# Patient Record
Sex: FEMALE | Race: WHITE | Employment: PART TIME | URBAN - METROPOLITAN AREA
[De-identification: names, ages, dates, MRNs, and addresses within clinical notes are randomized per-mention and may not be internally consistent; named-entity substitution may affect disease eponyms.]

---

## 2018-09-17 ENCOUNTER — OFFICE VISIT (OUTPATIENT)
Dept: SURGERY | Age: 63
End: 2018-09-17

## 2018-09-17 VITALS
DIASTOLIC BLOOD PRESSURE: 74 MMHG | BODY MASS INDEX: 26.99 KG/M2 | HEIGHT: 65 IN | TEMPERATURE: 98.6 F | SYSTOLIC BLOOD PRESSURE: 140 MMHG | HEART RATE: 64 BPM | WEIGHT: 162 LBS | RESPIRATION RATE: 18 BRPM

## 2018-09-17 DIAGNOSIS — Z12.11 ENCOUNTER FOR SCREENING COLONOSCOPY: ICD-10-CM

## 2018-09-17 DIAGNOSIS — K62.5 RECTAL BLEEDING: Primary | ICD-10-CM

## 2018-09-17 DIAGNOSIS — K64.2 GRADE III HEMORRHOIDS: ICD-10-CM

## 2018-09-17 NOTE — MR AVS SNAPSHOT
[address] [facility] [address] [facility] 
[phone] Patient: Victoria Simpson MRN: [id number] FFB:[id number] Visit Information Date & Time Provider Department Dept. Phone Encounter #  
 9/17/2018 10:00 AM Jacklin Halsted, MD [facility] [phone] [id number] Follow-up Instructions Return for Colonoscopy. Upcoming Health Maintenance Date Due Hepatitis C Screening 1955 DTaP/Tdap/Td series (1 - Tdap) 7/15/1975 PAP AKA CERVICAL CYTOLOGY 7/15/1975 FOBT Q 1 YEAR AGE 50-75 7/15/2004 ZOSTER VACCINE AGE 60> 5/15/2014 BREAST CANCER SCRN MAMMOGRAM 8/25/2017 Influenza Age 5 to Adult 8/1/2018 Allergies as of 9/17/2018  Review Complete On: 9/17/2018 By: Jacklin Halsted, MD  
 No Known Allergies Current Immunizations  Never Reviewed No immunizations on file. Not reviewed this visit You Were Diagnosed With   
  
 Codes Comments Rectal bleeding    -  Primary ICD-10-CM: K62.5 ICD-9-CM: 569.3 Encounter for screening colonoscopy     ICD-10-CM: Z12.11 ICD-9-CM: V76.51 Grade III hemorrhoids     ICD-10-CM: G95.8 ICD-9-CM: 455.0 Vitals BP Pulse Temp Resp Height(growth percentile) Weight(growth percentile) 140/74 64 98.6 °F (37 °C) 18 5' 5\" (1.651 m) 162 lb (73.5 kg) BMI OB Status Smoking Status 26.96 kg/m2 Menopause Former Smoker BMI and BSA Data Body Mass Index Body Surface Area  
 26.96 kg/m 2 1.84 m 2 Preferred Pharmacy Pharmacy Name Phone [facility] [place] [address] On license of [facility] [phone] Your Updated Medication List  
  
   
This list is accurate as of 9/17/18 10:21 AM.  Always use your most recent med list.  
  
  
  
  
 sertraline 50 mg tablet Commonly known as:  ZOLOFT  
  
 ZYRTEC PO Take  by mouth. We Performed the Following RI ANOSCOPY DX W/COLLJ SPEC BR/WA SPX WHEN PRFRMD [id number] CPT(R)] Follow-up Instructions Return for Colonoscopy. To-Do List   
 As directed GI:  COLONOSCOPY Patient Instructions High fiber diet and fiber supplement like metamucil or citrucel powder one adult dose daily in large glass water schedule colonoscopy Introducing \[facility] [place] [facility]\"" & HEALTH SERVICES! Tomasz Jeannie introduces [employer] patient portal. Now you can access parts of your medical record, email your doctor's office, and request medication refills online. 1. In your internet browser, go to [email or web address]. Send Word Now/[employer] 2. Click on the First Time User? Click Here link in the Sign In box. You will see the New Member Sign Up page. 3. Enter your [employer] Access Code exactly as it appears below. You will not need to use this code after youve completed the sign-up process. If you do not sign up before the expiration date, you must request a new code. · [employer] Access Code: [email or web address] Expires: 12/16/2018  9:36 AM 
 
4. Enter the last four digits of your Social Security Number (xxxx) and Date of Birth (mm/dd/yyyy) as indicated and click Submit. You will be taken to the next sign-up page. 5. Create a [employer] ID. This will be your [employer] login ID and cannot be changed, so think of one that is secure and easy to remember. 6. Create a [employer] password. You can change your password at any time. 7. Enter your Password Reset Question and Answer. This can be used at a later time if you forget your password. 8. Enter your e-mail address. You will receive e-mail notification when new information is available in [address] 9. Click Sign Up. You can now view and download portions of your medical record. 10. Click the Download Summary menu link to download a portable copy of your medical information.  
 
If you have questions, please visit the Frequently Asked Questions section of the [employer]. Remember, [employer] is NOT to be used for urgent needs. For medical emergencies, dial 911. Now available from your iPhone and Android! Please provide this summary of care documentation to your next provider. Your primary care clinician is listed as Sonia Barrientos. If you have any questions after today's visit, please call [phone].

## 2018-09-17 NOTE — LETTER
9/17/2018 10:21 AM 
 
Patient:  Drea Solis YOB: 1955 Date of Visit: 9/17/2018 Júnior Sarmiento MD 
[address] [facility] [place] [facility] [place] [address] [facility] [address] VIA In Basket Dear Madhavi Taylor, I saw Nancie Vasquez in the office today regarding her hemorrhoids. Her symptoms are very mild, she has occasional spotting on the toilet paper and mild discomfort with bowel movement. On anoscopy she does have some enlarged internal hemorrhoids in the right anterior and posterior quadrants. I will start her on a fiber supplement to see if this helps her symptoms and we will schedule her for colonoscopy for screening purposes. She can return to the office should hemorrhoidal symptoms persist.  I am somewhat hesitant to recommend surgery, as the patient states she has had a large number of vaginal deliveries and displays a slightly patulous anus on exam.  The hemorrhoids do contribute a small amount to overall continence. Thank you very much for your referral of Ms. Drea Solis. If you have questions, please do not hesitate to call me. I look forward to following Ms. Collin Kirt along with you and will keep you updated as to her progress. Sincerely, Syed Pedroza MD

## 2018-09-17 NOTE — PROGRESS NOTES
HPI: Fawn Cerrato is a 58 y.o. female presenting with chief complain of hemorrhoids and need for colorectal cancer screening. Patient has had mild hemorrhoidal symptoms for years. She has discomfort with defecation and occasional spotting blood on the toilet paper. She does not need to manually reduce the hemorrhoids. She denies abdominal complaints or unexplained weight loss. She has 1 bowel movement per day, sporadic episodes of diarrhea. She is not on a bowel regimen. She has had a few liquid stool incontinent episodes in her lifetime. She has never had a colonoscopy. She has no family history of colorectal cancer. Past medical history: Negative    Past Surgical History:   Procedure Laterality Date    HX DILATION AND CURETTAGE      HX HEENT      broken nose       Family medical history: No history of colon rectal cancer    Social History     Social History    Marital status:      Spouse name: N/A    Number of children: N/A    Years of education: N/A     Social History Main Topics    Smoking status: Former Smoker     Quit date: 9/17/2011    Smokeless tobacco: Never Used    Alcohol use 0.0 oz/week     0 Standard drinks or equivalent per week      Comment: occasional    Drug use: None    Sexual activity: Not Asked     Other Topics Concern    None     Social History Narrative       Review of Systems - Review of Systems   Constitutional: Negative. HENT: Negative. Eyes: Negative. Respiratory: Negative. Cardiovascular: Negative. Gastrointestinal: Positive for blood in stool and diarrhea. Negative for abdominal pain, constipation, heartburn, melena, nausea and vomiting. Genitourinary: Negative. Musculoskeletal: Negative. Skin: Negative. Neurological: Negative. Endo/Heme/Allergies: Negative. Psychiatric/Behavioral: Negative.         Outpatient Prescriptions Marked as Taking for the 9/17/18 encounter (Office Visit) with aCrlyle Mas MD   Medication Sig Dispense Refill    cetirizine HCl (ZYRTEC PO) Take  by mouth. No Known Allergies    Vitals:    09/17/18 0959   BP: 140/74   Pulse: 64   Resp: 18   Temp: 98.6 °F (37 °C)   Weight: 73.5 kg (162 lb)   Height: 5' 5\" (1.651 m)   PainSc:   0 - No pain       Physical Exam   Constitutional: She appears well-developed and well-nourished. HENT:   Head: Normocephalic and atraumatic. Eyes: Conjunctivae and EOM are normal.   Abdominal: Soft. She exhibits no distension. There is no tenderness. Musculoskeletal: Normal range of motion. Lymphadenopathy:     She has no cervical adenopathy. Right: No inguinal adenopathy present. Left: No inguinal adenopathy present. Neurological: She exhibits normal muscle tone. Skin: Skin is warm and dry. No rash noted. Psychiatric: She has a normal mood and affect. Her speech is normal.   Rectum: Prolapsed right anterior quadrant internal hemorrhoid, somewhat patulous anus  Digital rectal exam: Moderate tone, no mass  Anoscopy: Enlarged internal hemorrhoids in right anterior and posterior quadrants    Assessment / Plan    Grade 3 hemorrhoids  Symptoms are very mild, recommend high-fiber diet and fiber supplementation  Patient has has had numerous deliveries and displays a slightly patulous anus, exhaust conservative measures prior to considering hemorrhoid surgery    Need for colorectal cancer screening  Schedule colonoscopy      The diagnoses and plan were discussed with the patient. All questions answered. Plan of care agreed to by all concerned.

## 2018-09-17 NOTE — PATIENT INSTRUCTIONS
High fiber diet and fiber supplement like metamucil or citrucel powder one adult dose daily in large glass water schedule colonoscopy

## 2020-01-08 ENCOUNTER — TELEPHONE (OUTPATIENT)
Dept: SURGERY | Age: 65
End: 2020-01-08

## 2023-12-29 ENCOUNTER — HOSPITAL ENCOUNTER (OUTPATIENT)
Facility: HOSPITAL | Age: 68
Discharge: HOME | End: 2023-12-29
Attending: STUDENT IN AN ORGANIZED HEALTH CARE EDUCATION/TRAINING PROGRAM
Payer: COMMERCIAL

## 2023-12-29 DIAGNOSIS — Z13.820 OSTEOPOROSIS SCREENING: ICD-10-CM

## 2023-12-29 DIAGNOSIS — R93.7 ABNORMAL BONE DENSITY SCREENING: ICD-10-CM

## 2023-12-29 PROCEDURE — 77080 DXA BONE DENSITY AXIAL: CPT

## 2024-01-12 ENCOUNTER — HOSPITAL ENCOUNTER (OUTPATIENT)
Facility: HOSPITAL | Age: 69
Setting detail: RECURRING SERIES
Discharge: HOME | End: 2024-01-15
Payer: COMMERCIAL

## 2024-01-12 PROCEDURE — 97161 PT EVAL LOW COMPLEX 20 MIN: CPT

## 2024-01-12 PROCEDURE — 97110 THERAPEUTIC EXERCISES: CPT

## 2024-01-12 NOTE — THERAPY EVALUATION
[facility]  [address] [place] [address] Ph:[phone] Fx: [phone]  Plan of Care / Statement of Necessity for Physical Therapy Services     Patient Name: Julia Tee : 1955   Medical   Diagnosis: Right shoulder pain [M25.511]  Left shoulder pain [M25.512]  Upper back pain [M54.9]  Right thigh pain [M79.651]  Left thigh pain [M79.652] Treatment Diagnosis: M79.604  Pain in right leg and M79.605  Pain in left leg , M25.511  RIGHT SHOULDER PAIN and M25.512  LEFT SHOULDER PAIN, and M54.2  NECK PAIN      Onset Date: 1 yr Payor :  Payor: MARTY / Plan: HUY FERGUSON FEDERAL / Product Type: *No Product type* /    Referral Source: Chanell Maria MD Start of Care (SOC): 2024   Prior Hospitalization: See medical history Provider #: [id number]   Prior Level of Function: , Likes to take walks 1-2 miles, ride her Bike.    Comorbidities: HBP, LS Pain     Assessment / key information:  Pt ia a 68 yr old female with onset of CS upper Bach, And bilateral shoulder pain as well as Hip and Thigh pain that insidiously began approx 1 yr ago. Pt reports mostly tightness that is inhibiting Functional Activities and ADL's. Pt is RHD and pain travels into her right UE intermittently.   Pt has mobility restrictions at the CT junction and tightness to UT /LS bilaterally. She is restricted  in CS rotation/Extension as well as  Side bend. Bilateral Shoulder ROM is WFL in all directions including Functional IR.   Pt  reports bilateral hip tightness and sometimes pain to her right Hip flexor. She is restricted in hip IR/ER on the right and has weak right hip Glutes and abductors.      Pt will benefit from skilled PT to improve ROM, decrease pain and improve functional activity tolerance to be able to tolerate ADL's.     Evaluation Complexity:  History:  LOW Complexity : Zero comorbidities / personal factors that will impact the outcome /

## 2024-01-12 NOTE — PROGRESS NOTES
PHYSICAL / OCCUPATIONAL THERAPY - DAILY TREATMENT NOTE    Patient Name: Julia Tee    Date: 2024    : 1955  Insurance: Payor: MARTY / Plan: HUY FERGUSON FEDERAL / Product Type: *No Product type* /      Patient  verified Yes     Visit #   Current / Total 1 8-12   Time   In / Out 1001 1039   Pain   In / Out 3 3   Subjective Functional Status/Changes: Pt ia a 68 yr old female with onset of CS upper Bach, And bilateral shoulder pain as well as Hip and Thigh pain that insidiously began approx 1 yr ago. Pt reports mostly tightness that is inhibiting Functional Activities and ADL's. Pt is RHD and pain travels into her right UE intermittently.      TREATMENT AREA =  Right shoulder pain [M25.511]  Left shoulder pain [M25.512]  Upper back pain [M54.9]  Right thigh pain [M79.651]  Left thigh pain [M79.652]    OBJECTIVE         Therapeutic Procedures:    Tx Min Billable or 1:1 Min (if diff from Tx Min) Procedure, Rationale, Specifics   25  32977 Therapeutic Exercise (timed):  increase ROM, strength, coordination, balance, and proprioception to improve patient's ability to progress to PLOF and address remaining functional goals. (see flow sheet as applicable)     Details if applicable:       25  [facility] Totals Reminder: bill using total billable min of TIMED therapeutic procedures (example: do not include dry needle or estim unattended, both untimed codes, in totals to left)  8-22 min = 1 unit; 23-37 min = 2 units; 38-52 min = 3 units; 53-67 min = 4 units; 68-82 min = 5 units   Total Total     [x]  Patient Education billed concurrently with other procedures   [x] Review HEP    [] Progressed/Changed HEP, detail:    [] Other detail:       Objective Information/Functional Measures/Assessment    CS : Rotation left=38 deg/ Right=42 deg.   CS Sidebend: left=10 deg/ Right=12 deg  CS Extension= 42 deg.     Bilateral Shoulder ROM : WFL/All directions.   Posture : fair/good.     Right Hip Abd=: 4/5/ Left Hip

## 2024-01-15 ENCOUNTER — HOSPITAL ENCOUNTER (OUTPATIENT)
Facility: HOSPITAL | Age: 69
Setting detail: RECURRING SERIES
Discharge: HOME | End: 2024-01-18
Payer: COMMERCIAL

## 2024-01-15 PROCEDURE — 97530 THERAPEUTIC ACTIVITIES: CPT

## 2024-01-15 PROCEDURE — 97110 THERAPEUTIC EXERCISES: CPT

## 2024-01-15 NOTE — PROGRESS NOTES
PHYSICAL / OCCUPATIONAL THERAPY - DAILY TREATMENT NOTE    Patient Name: Julia Tee    Date: 1/15/2024    : 1955  Insurance: Payor: MARTY / Plan: HUY FERGUSON FEDERAL / Product Type: *No Product type* /      Patient  verified Yes     Visit #   Current / Total 2 8-12   Time   In / Out 10:42 11:25   Pain   In / Out 5/10 3/10 \"a little better\"   Subjective Functional Status/Changes: Pt reports her pain has been a little more since she started doing her exercises.    Patient stated she hates giving a number, for pain, she hates that question.      TREATMENT AREA =  Right shoulder pain [M25.511]  Left shoulder pain [M25.512]  Upper back pain [M54.9]  Right thigh pain [M79.651]  Left thigh pain [M79.652]    OBJECTIVE    Modalities Rationale:     decrease pain and increase tissue extensibility to improve patient's ability to progress to PLOF and address remaining functional goals.     min [] Estim Unattended, type/location:                                      []  w/ice    []  w/heat    min [] Estim Attended, type/location:                                     []  w/US     []  w/ice    []  w/heat    []  TENS insruct      min []  Mechanical Traction: type/lbs                   []  pro   []  sup   []  int   []  cont    []  before manual    []  after manual    min []  Ultrasound, settings/location:     10 min  unbill []  Ice     [x]  Heat    location/position: Seated - c/s and bilateral UTs    min []  Paraffin,  details:     min []  Vasopneumatic Device, press/temp:     min []  Whirlpool / Fluido:    If using vaso (only need to measure limb vaso being performed on)      pre-treatment girth :       post-treatment girth :       measured at (landmark location) :      min []  Other:    Skin assessment post-treatment:   Intact      Therapeutic Procedures:    Tx Min Billable or 1:1 Min (if diff from Tx Min) Procedure, Rationale, Specifics   25  69274 Therapeutic Exercise (timed):  increase ROM, strength, coordination,

## 2024-01-18 ENCOUNTER — HOSPITAL ENCOUNTER (OUTPATIENT)
Facility: HOSPITAL | Age: 69
Setting detail: RECURRING SERIES
Discharge: HOME | End: 2024-01-21
Payer: COMMERCIAL

## 2024-01-18 PROCEDURE — 97530 THERAPEUTIC ACTIVITIES: CPT

## 2024-01-18 PROCEDURE — 97110 THERAPEUTIC EXERCISES: CPT

## 2024-01-18 NOTE — PROGRESS NOTES
PHYSICAL / OCCUPATIONAL THERAPY - DAILY TREATMENT NOTE    Patient Name: Julia Tee    Date: 2024    : 1955  Insurance: Payor: MARTY / Plan: HUY FERGUSON FEDERAL / Product Type: *No Product type* /      Patient  verified Yes     Visit #   Current / Total 3 8-12   Time   In / Out 2:40 3:34   Pain   In / Out 5/10 both hips 1/10   Subjective Functional Status/Changes: Pt reports she is noticing she is a little more sore and has been doing her exercises once a day.      TREATMENT AREA =  Right shoulder pain [M25.511]  Left shoulder pain [M25.512]  Upper back pain [M54.9]  Right thigh pain [M79.651]  Left thigh pain [M79.652]    OBJECTIVE    Modalities Rationale:     decrease pain and increase tissue extensibility to improve patient's ability to progress to PLOF and address remaining functional goals.     min [] Estim Unattended, type/location:                                      []  w/ice    []  w/heat    min [] Estim Attended, type/location:                                     []  w/US     []  w/ice    []  w/heat    []  TENS insruct      min []  Mechanical Traction: type/lbs                   []  pro   []  sup   []  int   []  cont    []  before manual    []  after manual    min []  Ultrasound, settings/location:     10 min  unbill []  Ice     [x]  Heat    location/position: Seated - bilateral hips    min []  Paraffin,  details:     min []  Vasopneumatic Device, press/temp:     min []  Whirlpool / Fluido:    If using vaso (only need to measure limb vaso being performed on)      pre-treatment girth :       post-treatment girth :       measured at (landmark location) :      min []  Other:    Skin assessment post-treatment:   Intact      Therapeutic Procedures:    Tx Min Billable or 1:1 Min (if diff from Tx Min) Procedure, Rationale, Specifics   32  05094 Therapeutic Exercise (timed):  increase ROM, strength, coordination, balance, and proprioception to improve patient's ability to progress to PLOF and

## 2024-01-23 ENCOUNTER — HOSPITAL ENCOUNTER (OUTPATIENT)
Facility: HOSPITAL | Age: 69
Setting detail: RECURRING SERIES
Discharge: HOME | End: 2024-01-26
Payer: COMMERCIAL

## 2024-01-23 PROCEDURE — 97530 THERAPEUTIC ACTIVITIES: CPT

## 2024-01-23 PROCEDURE — 97110 THERAPEUTIC EXERCISES: CPT

## 2024-01-23 NOTE — PROGRESS NOTES
location) :      min []  Other:    Skin assessment post-treatment:   Intact      Therapeutic Procedures:    Tx Min Billable or 1:1 Min (if diff from Tx Min) Procedure, Rationale, Specifics   30  67376 Therapeutic Exercise (timed):  increase ROM, strength, coordination, balance, and proprioception to improve patient's ability to progress to PLOF and address remaining functional goals. (see flow sheet as applicable)     Details if applicable:       18  76704 Therapeutic Activity (timed):  use of dynamic activities replicating functional movements to increase ROM, strength, coordination, balance, and proprioception in order to improve patient's ability to progress to PLOF and address remaining functional goals.  (see flow sheet as applicable)     Details if applicable:  car transfer, review of DOMS and therapy progressions; ice vs heat application with ability to trial ice/ice massage to lateral hips/greater trochanter   48  [facility] Totals Reminder: bill using total billable min of TIMED therapeutic procedures (example: do not include dry needle or estim unattended, both untimed codes, in totals to left)  8-22 min = 1 unit; 23-37 min = 2 units; 38-52 min = 3 units; 53-67 min = 4 units; 68-82 min = 5 units   Total Total     [x]  Patient Education billed concurrently with other procedures   [x] Review HEP    [] Progressed/Changed HEP, detail:    [] Other detail:       Objective Information/Functional Measures/Assessment      Reviewed car transfer with sitting in seat first then bringing legs in 1 at a time and keeping feet closer together  Reviewed progressions with therapy including ability to transition to flexibility/stretching at home and more strength training during session  Min increased anterior right hip pain with mini lunges eliminated with breif rest      Progressed activities per flow sheet. Patient with good overall tolerance to session. Patient presently reports greatest difficulty with floor

## 2024-01-26 ENCOUNTER — HOSPITAL ENCOUNTER (OUTPATIENT)
Facility: HOSPITAL | Age: 69
Setting detail: RECURRING SERIES
Discharge: HOME | End: 2024-01-29
Payer: COMMERCIAL

## 2024-01-26 PROCEDURE — 97110 THERAPEUTIC EXERCISES: CPT

## 2024-01-26 PROCEDURE — 97530 THERAPEUTIC ACTIVITIES: CPT

## 2024-01-26 NOTE — PROGRESS NOTES
PHYSICAL / OCCUPATIONAL THERAPY - DAILY TREATMENT NOTE    Patient Name: Julia Tee    Date: 2024    : 1955  Insurance: Payor: MARTY / Plan: HUY FERGUSON FEDERAL / Product Type: *No Product type* /      Patient  verified yes   Visit #   Current / Total 5 12   Time   In / Out 10:00 10:38   Pain   In / Out 5/10 left shoulder 0/10   Subjective Functional Status/Changes: Pt reports she feels pretty good today just her shoulder hurts. Yesterday she hurts a lot but she noticed a pattern that she hurts more when she works, she is a  and is on her feet a lot.      TREATMENT AREA =  Right shoulder pain [M25.511]  Left shoulder pain [M25.512]  Upper back pain [M54.9]  Right thigh pain [M79.651]  Left thigh pain [M79.652]    OBJECTIVE    Therapeutic Procedures:    Tx Min Billable or 1:1 Min (if diff from Tx Min) Procedure, Rationale, Specifics   15  28613 Therapeutic Exercise (timed):  increase ROM, strength, coordination, balance, and proprioception to improve patient's ability to progress to PLOF and address remaining functional goals. (see flow sheet as applicable)     Details if applicable:  see flow sheet     23  58591 Therapeutic Activity (timed):  use of dynamic activities replicating functional movements to increase ROM, strength, coordination, balance, and proprioception in order to improve patient's ability to progress to PLOF and address remaining functional goals.  (see flow sheet as applicable)     Details if applicable:  mini squats, mini lunges, KZ row/ext for posture; eccentric sit to stand     38  [facility] Totals Reminder: bill using total billable min of TIMED therapeutic procedures (example: do not include dry needle or estim unattended, both untimed codes, in totals to left)  8-22 min = 1 unit; 23-37 min = 2 units; 38-52 min = 3 units; 53-67 min = 4 units; 68-82 min = 5 units   Total Total     [x]  Patient Education billed concurrently with other procedures   [x] Review HEP

## 2024-01-29 ENCOUNTER — HOSPITAL ENCOUNTER (OUTPATIENT)
Facility: HOSPITAL | Age: 69
Setting detail: RECURRING SERIES
Discharge: HOME | End: 2024-02-01
Payer: COMMERCIAL

## 2024-01-29 PROCEDURE — 97530 THERAPEUTIC ACTIVITIES: CPT

## 2024-01-29 PROCEDURE — 97110 THERAPEUTIC EXERCISES: CPT

## 2024-01-29 NOTE — PROGRESS NOTES
PHYSICAL / OCCUPATIONAL THERAPY - DAILY TREATMENT NOTE    Patient Name: Julia Tee    Date: 2024    : 1955  Insurance: Payor: MARTY / Plan: HUY FERGUSON FEDERAL / Product Type: *No Product type* /      Patient  verified yes   Visit #   Current / Total 6 12   Time   In / Out 3:20 3:58   Pain   In / Out 5/10 groin, R>L 2/10   Subjective Functional Status/Changes: Pt reports she has had groin pain, it is one thing she is coming for but it is what she is noticing most today.      TREATMENT AREA =  Right shoulder pain [M25.511]  Left shoulder pain [M25.512]  Upper back pain [M54.9]  Right thigh pain [M79.651]  Left thigh pain [M79.652]    OBJECTIVE    Therapeutic Procedures:    Tx Min Billable or 1:1 Min (if diff from Tx Min) Procedure, Rationale, Specifics   15  92492 Therapeutic Exercise (timed):  increase ROM, strength, coordination, balance, and proprioception to improve patient's ability to progress to PLOF and address remaining functional goals. (see flow sheet as applicable)     Details if applicable:  see flow sheet     23  54455 Therapeutic Activity (timed):  use of dynamic activities replicating functional movements to increase ROM, strength, coordination, balance, and proprioception in order to improve patient's ability to progress to PLOF and address remaining functional goals.  (see flow sheet as applicable)     Details if applicable:  mini squats, mini lunges, KZ row/ext for posture; eccentric sit to stand, ROM measurements     38  [facility] Totals Reminder: bill using total billable min of TIMED therapeutic procedures (example: do not include dry needle or estim unattended, both untimed codes, in totals to left)  8-22 min = 1 unit; 23-37 min = 2 units; 38-52 min = 3 units; 53-67 min = 4 units; 68-82 min = 5 units   Total Total     [x]  Patient Education billed concurrently with other procedures   [x] Review HEP    [] Progressed/Changed HEP, detail:    [] Other detail:       Objective

## 2024-02-02 ENCOUNTER — HOSPITAL ENCOUNTER (OUTPATIENT)
Facility: HOSPITAL | Age: 69
Setting detail: RECURRING SERIES
Discharge: HOME | End: 2024-02-05
Payer: COMMERCIAL

## 2024-02-02 PROCEDURE — 97530 THERAPEUTIC ACTIVITIES: CPT

## 2024-02-02 PROCEDURE — 97110 THERAPEUTIC EXERCISES: CPT

## 2024-02-02 PROCEDURE — 97112 NEUROMUSCULAR REEDUCATION: CPT

## 2024-02-02 NOTE — PROGRESS NOTES
PHYSICAL / OCCUPATIONAL THERAPY - DAILY TREATMENT NOTE    Patient Name: Julia Tee    Date: 2024    : 1955  Insurance: Payor: MARTY / Plan: [employer]Y [employer] FEDERAL / Product Type: *No Product type* /      Patient  verified Yes     Visit #   Current / Total 7 12   Time   In / Out 1252 149   Pain   In / Out 3 2   Subjective Functional Status/Changes: She has not been doing any walking. She has been doing the ex's. Pain is mostly at the hips and the  front and sides.      TREATMENT AREA =  Right shoulder pain [M25.511]  Left shoulder pain [M25.512]  Upper back pain [M54.9]  Right thigh pain [M79.651]  Left thigh pain [M79.652]    OBJECTIVE    Modalities Rationale:     decrease pain to improve patient's ability to progress to PLOF and address remaining functional goals.     min [] Estim Unattended, type/location:                                      []  w/ice    []  w/heat    min [] Estim Attended, type/location:                                     []  w/US     []  w/ice    []  w/heat    []  TENS insruct      min []  Mechanical Traction: type/lbs                   []  pro   []  sup   []  int   []  cont    []  before manual    []  after manual    min []  Ultrasound, settings/location:     10 min  unbill []  Ice     [x]  Heat    location/position:     min []  Paraffin,  details:     min []  Vasopneumatic Device, press/temp:     min []  Whirlpool / Fluido:    If using vaso (only need to measure limb vaso being performed on)      pre-treatment girth :       post-treatment girth :       measured at (landmark location) :      min []  Other:    Skin assessment post-treatment:   Intact      Therapeutic Procedures:    Tx Min Billable or 1:1 Min (if diff from Tx Min) Procedure, Rationale, Specifics   [id number] Therapeutic Exercise (timed):  increase ROM, strength, coordination, balance, and proprioception to improve patient's ability to progress to PLOF and address remaining functional goals. (see flow sheet as

## 2024-02-05 ENCOUNTER — HOSPITAL ENCOUNTER (OUTPATIENT)
Facility: HOSPITAL | Age: 69
Setting detail: RECURRING SERIES
Discharge: HOME | End: 2024-02-08
Payer: COMMERCIAL

## 2024-02-05 PROCEDURE — 97530 THERAPEUTIC ACTIVITIES: CPT | Performed by: PHYSICAL THERAPIST

## 2024-02-05 PROCEDURE — 97110 THERAPEUTIC EXERCISES: CPT | Performed by: PHYSICAL THERAPIST

## 2024-02-05 NOTE — PROGRESS NOTES
PHYSICAL / OCCUPATIONAL THERAPY - DAILY TREATMENT NOTE    Patient Name: Julia Tee    Date: 2024    : 1955  Insurance: Payor: MARTY / Plan: HUY [employer] FEDERAL / Product Type: *No Product type* /      Patient  verified Yes     Visit #   Current / Total 8 12   Time   In / Out 1122am 1218 (56)   Pain   In / Out 7-8 7-8   Subjective Functional Status/Changes: Pt reports significant increased pain in R groin and lateral hip after last session.      TREATMENT AREA =  Right shoulder pain [M25.511]  Left shoulder pain [M25.512]  Upper back pain [M54.9]  Right thigh pain [M79.651]  Left thigh pain [M79.652]    OBJECTIVE    Modalities Rationale:     decrease pain to improve patient's ability to progress to PLOF and address remaining functional goals.     min [] Estim Unattended, type/location:                                      []  w/ice    []  w/heat    min [] Estim Attended, type/location:                                     []  w/US     []  w/ice    []  w/heat    []  TENS insruct      min []  Mechanical Traction: type/lbs                   []  pro   []  sup   []  int   []  cont    []  before manual    []  after manual    min []  Ultrasound, settings/location:     15 min  unbill [x]  Ice     [x]  Heat    location/position: Ice to lateral R hip , MHP to c/s     min []  Paraffin,  details:     min []  Vasopneumatic Device, press/temp:     min []  Whirlpool / Fluido:    If using vaso (only need to measure limb vaso being performed on)      pre-treatment girth :       post-treatment girth :       measured at (landmark location) :      min []  Other:    Skin assessment post-treatment:   Intact      Therapeutic Procedures:    Tx Min Billable or 1:1 Min (if diff from Tx Min) Procedure, Rationale, Specifics   26  [id number] Therapeutic Exercise (timed):  increase ROM, strength, coordination, balance, and proprioception to improve patient's ability to progress to PLOF and address remaining functional goals. (see

## 2024-02-09 ENCOUNTER — HOSPITAL ENCOUNTER (OUTPATIENT)
Facility: HOSPITAL | Age: 69
Setting detail: RECURRING SERIES
Discharge: HOME | End: 2024-02-12
Payer: COMMERCIAL

## 2024-02-09 PROCEDURE — 97110 THERAPEUTIC EXERCISES: CPT

## 2024-02-09 PROCEDURE — 97530 THERAPEUTIC ACTIVITIES: CPT

## 2024-02-09 PROCEDURE — 97140 MANUAL THERAPY 1/> REGIONS: CPT

## 2024-02-09 NOTE — PROGRESS NOTES
PHYSICAL / OCCUPATIONAL THERAPY - DAILY TREATMENT NOTE    Patient Name: Julia Tee    Date: 2024    : 1955  Insurance: Payor: MARTY / Plan: HUY [employer] FEDERAL / Product Type: *No Product type* /      Patient  verified Yes     Visit #   Current / Total 9 12   Time   In / Out 1240 141   Pain   In / Out 7 3   Subjective Functional Status/Changes: Reports no change in sx so far. Her right hip continues to hurt and she thinks it's getting worst.      TREATMENT AREA =  Right shoulder pain [M25.511]  Left shoulder pain [M25.512]  Upper back pain [M54.9]  Right thigh pain [M79.651]  Left thigh pain [M79.652]    OBJECTIVE    Modalities Rationale:     decrease pain to improve patient's ability to progress to PLOF and address remaining functional goals.     min [] Estim Unattended, type/location:                                      []  w/ice    []  w/heat    min [] Estim Attended, type/location:                                     []  w/US     []  w/ice    []  w/heat    []  TENS insruct      min []  Mechanical Traction: type/lbs                   []  pro   []  sup   []  int   []  cont    []  before manual    []  after manual    min []  Ultrasound, settings/location:     15 min  unbill [x]  Ice     [x]  Heat    location/position: Ice to lateral R hip , MHP to c/s     min []  Paraffin,  details:     min []  Vasopneumatic Device, press/temp:     min []  Whirlpool / Fluido:    If using vaso (only need to measure limb vaso being performed on)      pre-treatment girth :       post-treatment girth :       measured at (landmark location) :      min []  Other:    Skin assessment post-treatment:   Intact      Therapeutic Procedures:    Tx Min Billable or 1:1 Min (if diff from Tx Min) Procedure, Rationale, Specifics   18  45682 Therapeutic Exercise (timed):  increase ROM, strength, coordination, balance, and proprioception to improve patient's ability to progress to PLOF and address remaining functional goals. (see 
VENKATESH ZARCO [facility]  [address] [place] [address] - Ph: [phone]   Fx: [phone]  PHYSICAL THERAPY PROGRESS NOTE  [x] Progress Note  [] Discharge Summary    Patient Name: Julia Tee : 1955   Treatment/Medical Diagnosis: Right shoulder pain [M25.511]  Left shoulder pain [M25.512]  Upper back pain [M54.9]  Right thigh pain [M79.651]  Left thigh pain [M79.652]   Referral Source: Chanell Maria MD     Date of Initial Visit: 2024 Attended Visits: 9 Missed Visits: 0       Comorbidities: HBP, LS Pain   Prior Level of Function:, Likes to take walks 1-2 miles, ride her Bike     SUMMARY OF TREATMENT  ***    CURRENT STATUS/Progress towards Goals:    Short Term Goals: To be accomplished in 1 weeks  Goal:Pt will be issued a HEP to improve function.   Status at evaluation: Issued HEP at Eval.  Current:      Long Term Goals: To be accomplished in 6 weeks  Goal: Pt  will increase FOTO score by 7  pts to improve function.   Status at evaluation:   Current:     2.   Goal:Pt will report >60% improvement in sx to ease with ADL's  Status at evaluation:0% james Eval.   Current:     3.   Goal:Pt will increase CS rotation >7 deg bilaterally to ease with Driving function.   Status at evaluation: Right  CS rotation=42 deg/ Left CS rotation=38 deg  Current:     4. Goal: Pt will increase CS side bend >15 deg bilaterally to ease with function.   Status at Evaluation: Left Side bend=10 deg/Right Side bend =12 deg.  Current:    Non-Medicare, can change goals, can adjust or add frequency duration, no signature required      New Goals to be achieved in __6__ WEEKS    1. Patient will increase FOTO at least 7 points (66/100) to demonstrate improvement in functional mobility.  Status at last eval:    2. Patient will report \"no difficulty\" when asked on the FOTO questionnaire, \"How much difficulty do you have turning to look behind you\" to 
weeks  Goal:Pt will be issued a HEP to improve function.   Status at evaluation: Issued HEP at Eval.  Current: MET.      Long Term Goals: To be accomplished in 6 weeks  Goal: Pt  will increase FOTO score by 7  pts to improve function.   Status at evaluation:   Current: FOTO=61 not met     2.   Goal:Pt will report >60% improvement in sx to ease with ADL's  Status at evaluation:0% james Eval.   Current:0% reported     3.   Goal:Pt will increase CS rotation >7 deg bilaterally to ease with Driving function.   Status at evaluation: Right  CS rotation=42 deg/ Left CS rotation=38 deg  Current: C/s AROM rotation: left= 55 deg right =38 deg     4. Goal: Pt will increase CS side bend >15 deg bilaterally to ease with function.   Status at Evaluation: Left Side bend=10 deg/Right Side bend =12 deg.  Current:Current: Left Side bend=25 deg/Right Side bend =25 deg (24)   Current: met    Non-Medicare, can change goals, can adjust or add frequency duration, no signature required      New Goals to be achieved in __6__ WEEKS    1. Patient will increase FOTO at least 7 points (66/100) to demonstrate improvement in functional mobility.  Status at last eval: FOTO=61    2. Patient will report \"no difficulty\" when asked on the FOTO questionnaire, \"How much difficulty do you have turning to look behind you\" to demonstrate improved ease of completing ADLs.  Status at last eval: \"quite a bit of difficulty\"    3. Goal: Pt will report >40% improvement in sx to ease with ADL's  Status at evaluation:0% james Eval.   Current:0% reported      4.Pt will report pain <4/10 to ease with ADL's  Status at last eval: Pain av-7/10    Frequency / Duration:   Patient to be seen   1-2   times per week for   6    WEEKS    RECOMMENDATIONS  We would like to continue therapy for progress to goals stated above.  Continue per initial Plan of Care.    If you have any questions/comments please contact us directly.  Thank you for allowing us to assist in the care 
accomplished in 1 weeks  Goal:Pt will be issued a HEP to improve function.   Status at evaluation: Issued HEP at Eval.  Current: progressing (1/15/24), MET (1/26/24)     Long Term Goals: To be accomplished in 6 weeks  Goal: Pt  will increase FOTO score by 7  pts to improve function.   Status at evaluation: 59/100     2.   Goal:Pt will report >60% improvement in sx to ease with ADL's  Status at evaluation:0% james Eval.      3.   Goal:Pt will increase CS rotation >7 deg bilaterally to ease with Driving function.   Status at evaluation: Right  CS rotation=42 deg/ Left CS rotation=38 deg  Current: Right  CS rotation=45 deg/ Left CS rotation=52 deg (1/29/24), L: 26deg, R:40deg 2/5/24     4. Goal: Pt will increase CS side bend >15 deg bilaterally to ease with function.   Status at Evaluation: Left Side bend=10 deg/Right Side bend =12 deg.   Current: Left Side bend=25 deg/Right Side bend =25 deg (1/29/24)      Next PN/ RC due 3/10/24  Auth due NESHA     PLAN  - Upgrade activities as tolerated    Laura M Behrens, PTA    2/9/2024    7:49 AM    Future Appointments   Date Time Provider Department Center   2/9/2024 12:40 PM Behrens, Laura M, PTA [id number]   3/15/2024 10:00 AM Julieta Oseguera MD [facility]

## 2024-03-15 ENCOUNTER — OFFICE VISIT (OUTPATIENT)
Age: 69
End: 2024-03-15
Payer: COMMERCIAL

## 2024-03-15 VITALS
BODY MASS INDEX: 27.66 KG/M2 | WEIGHT: 166 LBS | TEMPERATURE: 97.4 F | HEIGHT: 65 IN | OXYGEN SATURATION: 95 % | HEART RATE: 53 BPM

## 2024-03-15 DIAGNOSIS — M62.838 MUSCLE SPASM: ICD-10-CM

## 2024-03-15 DIAGNOSIS — M54.2 NECK PAIN: Primary | ICD-10-CM

## 2024-03-15 DIAGNOSIS — M50.30 DDD (DEGENERATIVE DISC DISEASE), CERVICAL: ICD-10-CM

## 2024-03-15 DIAGNOSIS — M47.812 CERVICAL FACET JOINT SYNDROME: ICD-10-CM

## 2024-03-15 PROCEDURE — 1123F ACP DISCUSS/DSCN MKR DOCD: CPT | Performed by: PHYSICAL MEDICINE & REHABILITATION

## 2024-03-15 PROCEDURE — 72040 X-RAY EXAM NECK SPINE 2-3 VW: CPT | Performed by: PHYSICAL MEDICINE & REHABILITATION

## 2024-03-15 PROCEDURE — 99203 OFFICE O/P NEW LOW 30 MIN: CPT | Performed by: PHYSICAL MEDICINE & REHABILITATION

## 2024-03-15 RX ORDER — HYDROCHLOROTHIAZIDE 25 MG/1
25 TABLET ORAL DAILY
COMMUNITY

## 2024-03-15 NOTE — PROGRESS NOTES
[facility]  [address] [place] [address]., [address]  [place] [address]  Phone: [phone]  Fax: [phone]    NEW PATIENT  Pt's YOB: 1955    ASSESSMENT   Julia was seen today for neck pain and hip pain.    Diagnoses and all orders for this visit:    Neck pain  -     [81492] C Spine 2-3V  -     MRI CERVICAL SPINE WO CONTRAST; Future    DDD (degenerative disc disease), cervical  -     MRI CERVICAL SPINE WO CONTRAST; Future    Cervical facet joint syndrome  -     MRI CERVICAL SPINE WO CONTRAST; Future    Muscle spasm  -     MRI CERVICAL SPINE WO CONTRAST; Future         IMPRESSION AND PLAN:  Julia Tee is a 68 y.o. female with history of neck pain that radiates into her right shoulder without any radicular symptoms. She has cervical spondylolisthesis with significant arthritis and inflammation.     1) Pt was given information on neck arthritis exercises.   2) Discussed treatment options with the patient including surgery, injections, neuropathic medications, and physical therapy.   3) Cervical spine MRI was ordered to assess for spinal stenosis, Impingement and inflammation; pt has been using NSAIDs and completed 6 weeks of PT.  4) Recommended pt increase her Vitamin D levels. General recommendation is consuming 2000u daily. Also recommended increasing Zinc with a general recommendation of 10 mg daily.  5) Ms. Tee has a reminder for a \"due or due soon\" health maintenance. I have asked that she contact her primary care provider, Fely Kirkpatrick MD, for follow-up on this health maintenance.  6)  demonstrated consistency with prescribing.   Return in about 6 weeks (around 4/26/2024) for Diagnostic follow up.      HISTORY OF PRESENT ILLNESS:  Julia Tee is a 68 y.o. female with history of neck pain that radiates into her right shoulder x years. Pt presents to the office today as a new patient referred by Dr. Obrien.  She completed 6 weeks of

## 2024-03-15 NOTE — PROGRESS NOTES
Julia Tee presents today for   Chief Complaint   Patient presents with    Neck Pain    Hip Pain     Bilateral        Is someone accompanying this pt? no    Is the patient using any DME equipment during OV? no      Coordination of Care:  1. Have you been to the ER, urgent care clinic since your last visit? no  Hospitalized since your last visit? no    2. Have you seen or consulted any other health care providers outside of the [facility] since your last visit? Yes, physical therapy, pcp  Include any pap smears or colon screening. no

## 2024-04-08 ENCOUNTER — HOSPITAL ENCOUNTER (OUTPATIENT)
Facility: HOSPITAL | Age: 69
Discharge: HOME | End: 2024-04-11
Attending: PHYSICAL MEDICINE & REHABILITATION
Payer: COMMERCIAL

## 2024-04-08 DIAGNOSIS — M47.812 CERVICAL FACET JOINT SYNDROME: ICD-10-CM

## 2024-04-08 DIAGNOSIS — M50.30 DDD (DEGENERATIVE DISC DISEASE), CERVICAL: ICD-10-CM

## 2024-04-08 DIAGNOSIS — M62.838 MUSCLE SPASM: ICD-10-CM

## 2024-04-08 DIAGNOSIS — M54.2 NECK PAIN: ICD-10-CM

## 2024-04-08 PROCEDURE — 72141 MRI NECK SPINE W/O DYE: CPT

## 2024-05-06 ENCOUNTER — OFFICE VISIT (OUTPATIENT)
Age: 69
End: 2024-05-06
Payer: COMMERCIAL

## 2024-05-06 VITALS
WEIGHT: 167 LBS | HEART RATE: 53 BPM | SYSTOLIC BLOOD PRESSURE: 124 MMHG | HEIGHT: 65 IN | BODY MASS INDEX: 27.82 KG/M2 | DIASTOLIC BLOOD PRESSURE: 74 MMHG | OXYGEN SATURATION: 93 %

## 2024-05-06 DIAGNOSIS — L30.9 DERMATITIS: ICD-10-CM

## 2024-05-06 DIAGNOSIS — M25.812 ENLARGEMENT OF LEFT STERNOCLAVICULAR JOINT: ICD-10-CM

## 2024-05-06 DIAGNOSIS — M85.89 OSTEOPENIA OF MULTIPLE SITES: ICD-10-CM

## 2024-05-06 DIAGNOSIS — M47.812 CERVICAL FACET JOINT SYNDROME: Primary | ICD-10-CM

## 2024-05-06 DIAGNOSIS — M48.02 FORAMINAL STENOSIS OF CERVICAL REGION: ICD-10-CM

## 2024-05-06 DIAGNOSIS — M62.838 MUSCLE SPASM: ICD-10-CM

## 2024-05-06 PROCEDURE — 1123F ACP DISCUSS/DSCN MKR DOCD: CPT | Performed by: PHYSICAL MEDICINE & REHABILITATION

## 2024-05-06 PROCEDURE — 99214 OFFICE O/P EST MOD 30 MIN: CPT | Performed by: PHYSICAL MEDICINE & REHABILITATION

## 2024-05-06 RX ORDER — IBUPROFEN 800 MG/1
TABLET ORAL
COMMUNITY
Start: 2024-04-04

## 2024-05-06 RX ORDER — METHYLPREDNISOLONE 4 MG/1
TABLET ORAL
Qty: 1 KIT | Refills: 0 | Status: SHIPPED | OUTPATIENT
Start: 2024-05-06 | End: 2024-05-12

## 2024-05-06 NOTE — PROGRESS NOTES
[facility]  [address] [place] [address]., [address]  [place] [address]  Phone: [phone]  Fax: [phone]     Pt's YOB: 1955    ASSESSMENT   Julia was seen today for neck pain.    Diagnoses and all orders for this visit:    Cervical facet joint syndrome  -     methylPREDNISolone (MEDROL DOSEPACK) 4 MG tablet; Take by mouth.    Foraminal stenosis of cervical region    Enlargement of left sternoclavicular joint  -     XR STERNOCLAVICULAR JOINTS (MIN 3 VIEWS); Future    Muscle spasm    Dermatitis    Osteopenia of multiple sites         IMPRESSION AND PLAN:  Julia Tee is a 68 y.o. female with history of pain in the cervical region with radicular symptoms to the RUE. She has previously completed physical therapy without any improvement of symptoms. She is taking Naproxen 440 mg QD. Per last office note, pt has a history of osteopenia. She has had 11 children and 2 remain at home.     Ms. Tee has a reminder for a \"due or due soon\" health maintenance. I have asked that she contact her primary care provider, Fely Kirkpatrick MD, for follow-up on this health maintenance.   demonstrated consistency with prescribing.   Pt was given information on facet joint injections, medial branch neurotomy, and cervical epidural steroid injections; she defers at this time.  Pt was advised to supplement with zinc  Pt was given an MDP  Sternoclavicular x-ray ordered at the hospital for further assessment of swelling of this joint  Pt advised to follow up with dermatology for further assessment of diffuse erythematous skin lesions (pt reports history of skin cancer / precancerous lesions)  Pt defers on further treatment at this time  Return if symptoms worsen or fail to improve.        HISTORY OF PRESENT ILLNESS:  Julia Tee is a 68 y.o. RHD female with history of cervical DDD, cervical facet joint syndrome, cervical pain, and muscle spasms and presents to the